# Patient Record
Sex: FEMALE | Race: WHITE
[De-identification: names, ages, dates, MRNs, and addresses within clinical notes are randomized per-mention and may not be internally consistent; named-entity substitution may affect disease eponyms.]

---

## 2017-05-27 ENCOUNTER — HOSPITAL ENCOUNTER (EMERGENCY)
Dept: HOSPITAL 44 - ED | Age: 27
Discharge: HOME | End: 2017-05-27
Payer: SELF-PAY

## 2017-05-27 VITALS
DIASTOLIC BLOOD PRESSURE: 84 MMHG | SYSTOLIC BLOOD PRESSURE: 127 MMHG | SYSTOLIC BLOOD PRESSURE: 127 MMHG | DIASTOLIC BLOOD PRESSURE: 84 MMHG | SYSTOLIC BLOOD PRESSURE: 127 MMHG | DIASTOLIC BLOOD PRESSURE: 84 MMHG | SYSTOLIC BLOOD PRESSURE: 127 MMHG | DIASTOLIC BLOOD PRESSURE: 84 MMHG | DIASTOLIC BLOOD PRESSURE: 84 MMHG | SYSTOLIC BLOOD PRESSURE: 127 MMHG

## 2017-05-27 DIAGNOSIS — L03.114: Primary | ICD-10-CM

## 2017-05-27 PROCEDURE — 99283 EMERGENCY DEPT VISIT LOW MDM: CPT

## 2017-05-27 NOTE — ED PHYSICIAN DOCUMENTATION
Upper Extremity Problem





- HISTORIAN


Historian: patient





- HPI


Stated Complaint: left arm redness


Chief Complaint: Upper Extremity Problem


Additional Information: 





cellulitis lt forearm in tattoo done 2 days ago


Timing: still present, worse


Recent Injury: No


Severity: mild, moderate


Associated Symptoms: denies: fever, chills, difficulty breathing


Quality: tenderness (also slight oozing this am -  cloudy material;)





- ROS


CONST: no problems


EYES/ENT: none


CVS/RESP: none


GI/: none





- PAST HX


Past History: other (hypo thryoid)


Surgeries/Procedures: none


Immunizations: UTD


Allergies/Adverse Reactions: 


 Allergies











Allergy/AdvReac Type Severity Reaction Status Date / Time


 


Penicillins Allergy Intermediate Hives Verified 05/27/17 08:22


 


Sulfa (Sulfonamide Allergy Mild Rash Verified 05/27/17 08:22





Antibiotics)     





[Sulfa(Sulfonamide     





Antibiotics)]     














Home Medications: 


 Ambulatory Orders











 Medication  Instructions  Recorded


 


Cephalexin [Keflex] 500 mg PO TID #30 capsule 05/27/17














- SOCIAL HX


Smoking History: non-smoker


Alcohol Use: none


Drug Use: none





- FAMILY HX


Family History: no significant history





- VITAL SIGNS


Vital Signs: 





 Vital Signs











Temp Pulse Resp BP Pulse Ox


 


 98.6 F   71   16   127/84   99 


 


 05/27/17 08:24  05/27/17 08:24  05/27/17 08:24  05/27/17 08:24  05/27/17 08:24














- REVIEWED ASSESSMENTS


Nursing Assessment  Reviewed: Yes


Vitals Reviewed: Yes





Upper Extremity Problem





- EXAM


General Appearance: mild distress


Skin: warm/dry, normal color.  No: cyanosis, diaphoresis


Shoulder Exam: normal inspection


Elbow/Forearm Exam: soft tissue tenderness (area tattoo flexor surface inc 

tattoo and slightly surrounding )


Peripheral: sensation nml, motor nml


Central: oriented X3


Respiratory: no resp. distress, breath sounds nml


Abdomen: non-tender





Discharge


Clincal Impression: 


 cellulitis lt forearm area recent tottoo





Prescriptions: 


Cephalexin [Keflex] 500 mg PO TID #30 capsule


Referrals: 


Suzie Cote FNP [Primary Care Provider] - 2 Days


Home Medications: 


Ambulatory Orders





Cephalexin [Keflex] 500 mg PO TID #30 capsule 05/27/17 








Comments: 





to use liquid betadine area cellulitis topically-plus keflex.  keep clean soap 

water


Condition: Good


Disposition: 01 HOME, SELF-CARE


Decision to Admit: NO


Decision Time: 08:56

## 2019-03-19 ENCOUNTER — HOSPITAL ENCOUNTER (OUTPATIENT)
Dept: HOSPITAL 44 - LABRHC | Age: 29
End: 2019-03-19
Attending: NURSE PRACTITIONER
Payer: COMMERCIAL

## 2019-03-19 DIAGNOSIS — Z01.419: Primary | ICD-10-CM

## 2019-03-19 PROCEDURE — G0143 SCR C/V CYTO,THINLAYER,RESCR: HCPCS

## 2019-03-19 PROCEDURE — 87591 N.GONORRHOEAE DNA AMP PROB: CPT

## 2019-03-19 PROCEDURE — 87624 HPV HI-RISK TYP POOLED RSLT: CPT

## 2019-03-19 PROCEDURE — 88148 CYTOPATH C/V AUTO RESCREEN: CPT

## 2019-03-19 PROCEDURE — 87491 CHLMYD TRACH DNA AMP PROBE: CPT

## 2019-08-01 ENCOUNTER — HOSPITAL ENCOUNTER (OUTPATIENT)
Dept: HOSPITAL 44 - LABRHC | Age: 29
End: 2019-08-01
Attending: NURSE PRACTITIONER
Payer: SELF-PAY

## 2019-08-01 DIAGNOSIS — Z11.2: Primary | ICD-10-CM

## 2019-08-01 PROCEDURE — 87070 CULTURE OTHR SPECIMN AEROBIC: CPT

## 2019-11-15 ENCOUNTER — HOSPITAL ENCOUNTER (OUTPATIENT)
Dept: HOSPITAL 44 - LAB | Age: 29
End: 2019-11-15
Attending: NURSE PRACTITIONER
Payer: COMMERCIAL

## 2019-11-15 DIAGNOSIS — N89.8: Primary | ICD-10-CM

## 2019-11-15 PROCEDURE — 86694 HERPES SIMPLEX NES ANTBDY: CPT

## 2019-11-19 ENCOUNTER — HOSPITAL ENCOUNTER (OUTPATIENT)
Dept: HOSPITAL 44 - OUT | Age: 29
End: 2019-11-19
Attending: NURSE PRACTITIONER
Payer: COMMERCIAL

## 2019-11-19 DIAGNOSIS — R55: ICD-10-CM

## 2019-11-19 DIAGNOSIS — M54.2: Primary | ICD-10-CM

## 2019-11-19 DIAGNOSIS — R51: ICD-10-CM

## 2019-11-19 PROCEDURE — 99203 OFFICE O/P NEW LOW 30 MIN: CPT

## 2019-12-06 NOTE — CONSULTATION REPORT
DATE OF VISIT:  11/19/2019  



CHIEF COMPLAINT:  Cervicalgia.



HISTORY OF PRESENT ILLNESS:  Olga is a 29-year-old female patient, here for 
initial evaluation for neck pain.  Her symptoms started at age 17 and were 
sporadic, however, have worsened in the last 18 months.  She describes a pain 
that starts at the occiput and the cervical area on the right side.  The pain 
then worsens at the base of her skull and her neck.  The patient feels a 
shivering type discomfort that goes down her spine and then travels back up.  
Most recently the patient was witnessed losing consciousness for less than five 
minutes.  This has only occurred once where it was witnessed.  The pain that 
travels up and down her spine can last for 5 to 15 minutes.  Afterwards she has 
muscle pain primarily on the left side of her neck and back.



The patient has not seen a neurologist in quite some time.  She did when she was
in her early 20s.  However, she lost her insurance and was unable to proceed 
with the neurologic workup.  The patient has not had any recent imaging.  Her 
PCP has referred her to a neurologist, and she is scheduled in January to have 
her consultation with them.



PAST MEDICAL HISTORY:  The patient has anxiety, depression, and back pain.



PAST SURGICAL HISTORY:  None.



SOCIAL HISTORY:  Marital status, is single.  Occupation is accounts payable 
coordinator.  The patient is nulliparous.  Tobacco use denies.  ETOH denies.  
Recreational drug use denies.



DRUG ALLERGIES:  Penicillin/amoxicillin and the reaction is hives/trouble 
breathing.



CURRENT MEDICATIONS:  The patient is on amitriptyline 75 to 100 mg per day and 
cyclobenzaprine 5 to 10 mg p.r.n.



FAMILY HISTORY:  Father had cancer, MI and heart disease. Maternal grandmother 
had diabetes. Maternal grandfather had cancer. Paternal grandmother had diabetes
and paternal grandfather had a heart disease and a heart attack.



OBJECTIVE: 

General:  This is a well-developed, well-nourished female patient presenting in 
no acute distress at the time of examination.

Vital Signs:  The patient is 5 feet 1 inch tall, weighs 219 pounds.  Temperature
is 98.5, pulse is 89, respiratory rate is 14, blood pressure 125/80 with an SaO2
of 99% on room air.  She is rating her pain at 2/10 today.

Psych:  She is alert and oriented x3.  She is calm, pleasant and cooperative.

HEENT:  She is normocephalic and atraumatic.  PERRLA.  Trachea is midline.  
There is no lymphadenopathy or thyromegaly.

CV:  Normal S1, S2.  Regular rate and rhythm.  No gallops, rubs, or murmurs.

Pulmonary:  Nonlabored respirations at rest.  Lungs are clear to auscultation 
bilaterally and throughout.

GI:  Abdomen is soft, round, nondistended and nontender with bowel sounds 
present in all four quadrants.

Musculoskeletal:  The patient has full cervical range of motion.  She has 
tenderness to palpation at the C2-C3, C3-C4.  The patient has tenderness to 
palpation over the greater and lesser occipital nerves, more on the right side. 
Upper extremity strength is equal and strong in all areas graded 5/5 bilaterally
in triceps, biceps, wrist flexion, wrist extension, finger abduction, and finger
adduction.   are equal and strong bilaterally.  Sensation to light touch is
intact bilateral upper extremities.  Deep tendon reflexes are 2+ throughout 
bilateral upper extremities.  There are no obvious deformities on inspection of 
the cervical, thoracic or lumbar spine.  

Neurologic:  Cranial nerves II through XII are grossly intact.  There are no 
focal deficits.  The patient walks with a steady gait.  Speech is clear.



ASSESSMENT: 

1. Cervicalgia.

2. Seizure like activity with loss of consciousness.

3. Headaches.



PLAN:

1. I am going to defer any treatment at this time pending her neurologic workup.
 Once this is complete the patient is to return and is also to bring records of 
any imaging that they have done and recommendations for treatment.  I did 
collaborate with Dr. Fine regarding this and he agrees.

2. We will follow the patient up once the neurological consultation and work up 
is complete and then consider treatment options at that time.



The patient did verbalize understanding and agrees to the current treatment 
plan.







ELSIE Butlerurse Practitioner

/[employer] [id number]

D:  12/03/2019   T:  12/04/2019

Job #[id number] [id number]



cc:    TARYN Lopez